# Patient Record
Sex: FEMALE | Race: BLACK OR AFRICAN AMERICAN | NOT HISPANIC OR LATINO | Employment: PART TIME | URBAN - METROPOLITAN AREA
[De-identification: names, ages, dates, MRNs, and addresses within clinical notes are randomized per-mention and may not be internally consistent; named-entity substitution may affect disease eponyms.]

---
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
## 2019-05-03 ENCOUNTER — CLINICAL SUPPORT (OUTPATIENT)
Dept: INTERNAL MEDICINE | Facility: CLINIC | Age: 19
End: 2019-05-03

## 2019-05-03 DIAGNOSIS — Z23 NEED FOR VACCINATION AGAINST HEPATITIS A: ICD-10-CM

## 2019-05-03 PROCEDURE — 90471 IMMUNIZATION ADMIN: CPT | Performed by: PHYSICIAN ASSISTANT

## 2019-05-03 PROCEDURE — 90632 HEPA VACCINE ADULT IM: CPT | Performed by: PHYSICIAN ASSISTANT

## 2019-11-04 ENCOUNTER — CLINICAL SUPPORT (OUTPATIENT)
Dept: INTERNAL MEDICINE | Facility: CLINIC | Age: 19
End: 2019-11-04

## 2019-11-04 DIAGNOSIS — Z23 NEED FOR VACCINATION: ICD-10-CM

## 2019-11-04 PROCEDURE — 90686 IIV4 VACC NO PRSV 0.5 ML IM: CPT | Performed by: PHYSICIAN ASSISTANT

## 2019-11-04 PROCEDURE — 90471 IMMUNIZATION ADMIN: CPT | Performed by: PHYSICIAN ASSISTANT

## 2019-11-04 PROCEDURE — 90632 HEPA VACCINE ADULT IM: CPT | Performed by: PHYSICIAN ASSISTANT

## 2019-11-04 PROCEDURE — 90472 IMMUNIZATION ADMIN EACH ADD: CPT | Performed by: PHYSICIAN ASSISTANT

## 2022-10-26 ENCOUNTER — TELEPHONE (OUTPATIENT)
Dept: INTERNAL MEDICINE | Facility: CLINIC | Age: 22
End: 2022-10-26

## 2022-10-26 NOTE — TELEPHONE ENCOUNTER
Pt hasn't been seen in office w/ pcp for 5 years. Let know of recommendations, will need to be seen if sxs worsen.

## 2022-10-26 NOTE — TELEPHONE ENCOUNTER
PATIENT TESTED POSITIVE FOR COVID.  SYMPTOMS INCLUDE STUFFY NOSE, SORE THROAT, COUGH.  WENT TO URGENT TREATMENT BUT WAS NEGATIVE THERE WITH LOW FEVER.  WONDERS WHAT TO DO NOW?    PLEASE CALL [phone]

## 2024-08-08 ENCOUNTER — OFFICE VISIT (OUTPATIENT)
Dept: INTERNAL MEDICINE | Facility: CLINIC | Age: 24
End: 2024-08-08
Payer: COMMERCIAL

## 2024-08-08 VITALS
BODY MASS INDEX: 16.97 KG/M2 | DIASTOLIC BLOOD PRESSURE: 78 MMHG | SYSTOLIC BLOOD PRESSURE: 118 MMHG | WEIGHT: 99.4 LBS | RESPIRATION RATE: 16 BRPM | HEIGHT: 64 IN | OXYGEN SATURATION: 99 % | TEMPERATURE: 97.2 F | HEART RATE: 108 BPM

## 2024-08-08 DIAGNOSIS — Z00.00 ANNUAL PHYSICAL EXAM: Primary | ICD-10-CM

## 2024-08-08 DIAGNOSIS — Z23 NEED FOR VACCINATION: ICD-10-CM

## 2024-08-08 PROCEDURE — 99385 PREV VISIT NEW AGE 18-39: CPT

## 2024-08-08 PROCEDURE — 90651 9VHPV VACCINE 2/3 DOSE IM: CPT

## 2024-08-08 PROCEDURE — 90471 IMMUNIZATION ADMIN: CPT

## 2024-08-08 RX ORDER — MULTIPLE VITAMINS W/ MINERALS TAB 9MG-400MCG
TAB ORAL DAILY
COMMUNITY

## 2024-08-08 NOTE — PROGRESS NOTES
Female Physical Note      Date: 2024   Patient Name: Charley Sanchez  : 2000   MRN: [id number]     Chief Complaint:    Chief Complaint   Patient presents with    [facility]    Annual Exam       History of Present Illness: Charley Sanchez is a 23 y.o. female who is here today for their annual health maintenance and physical. Patient was previously under the care of a previous provider in the office. Since she was last seen, patient reports she has been doing well overall. 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 Past medical hx of eczema and migraines. She is currently only taking a multivitamin. She eats an overall balanced diet consisting of 2 meals a day. She reports lack of appetite in the morning. She is trying to exercise at least twice a week. Patient denies use of tobacco, ecigarettes, illicit drugs, and alcohol. She has never been sexually active. Reports normal menstrual cycles. She is compliant with ophthalmology and dental screenings. She has no complaints today.      Subjective      Review of Systems:   Review of Systems   Constitutional:  Negative for chills and fever.   HENT:  Negative for congestion and rhinorrhea.    Respiratory:  Negative for shortness of breath.    Cardiovascular:  Negative for chest pain.   Gastrointestinal:  Negative for abdominal pain, constipation and diarrhea.   Endocrine: Negative for polydipsia and polyuria.   Genitourinary:  Negative for dysuria.   Musculoskeletal:  Negative for myalgias.   Skin:  Negative for skin lesions.   Allergic/Immunologic: Negative for environmental allergies.   Neurological:  Negative for dizziness and headache.   Psychiatric/Behavioral:  Negative for suicidal ideas.        Past Medical History, Social History, Family History and Care Team were all reviewed with patient and updated as appropriate.      Medications:     Current Outpatient Medications:     multivitamin with minerals tablet tablet, Take  by mouth Daily., Disp: , Rfl:     Allergies:   Allergies   Allergen Reactions    Tylenol [Acetaminophen] Hives     Grape Flavor       Immunizations:  Health Maintenance Summary            Overdue - HEPATITIS C SCREENING (Once) Never done      No completion, postpone, or frequency change history exists for this topic.              Overdue - PAP SMEAR (Every 3 Years) Never done      No completion, postpone, or frequency change history exists for this topic.              Overdue - TDAP/TD VACCINES (2 - Td or Tdap) Overdue since 8/14/2019 08/14/2009  Outside Immunization: Tdap, Adsorbed              Overdue - COVID-19 Vaccine (4 - 2023-24 season) Overdue since 9/1/2023 01/21/2022  Imm Admin: COVID-19 (PFIZER) Purple Cap Monovalent    05/12/2021  Imm Admin: COVID-19 (PFIZER) Purple Cap Monovalent    04/20/2021  Imm Admin: COVID-19 (PFIZER) Purple Cap Monovalent              INFLUENZA VACCINE (Yearly - August to March) Due since 8/1/2024 11/04/2019  Imm Admin: flucelvax quad pfs =>4 YRS    11/18/2016  Imm Admin: influenza Split              HPV VACCINES (2 - 3-dose series) Due soon on 9/5/2024 08/08/2024  Imm Admin: Hpv9              ANNUAL PHYSICAL (Yearly) Next due on 8/8/2025 08/08/2024  Done              BMI FOLLOWUP (Yearly) Next due on 8/8/2025 08/08/2024  [employer]: BMI EDUCATION FOR UNDERWEIGHT    08/08/2024  [employer]: WORKFLOW - QUALITY MEASUREMENT - DOCUMENTED WEIGHT FOLLOW-UP PLAN              Pneumococcal Vaccine 0-64 (Series Information) Aged Out      No completion, postpone, or frequency change history exists for this topic.                     Orders Placed This Encounter   Procedures    HPV Vaccine      Ophthalmologist: last eye exam 2-3 years  Dentist: compliant with routine cleanings    Tobacco Use: Low Risk  (8/8/2024)    Patient History     Smoking Tobacco Use: Never  "    Smokeless Tobacco Use: Never     Passive Exposure: Not on file       Social History     Substance and Sexual Activity   Alcohol Use No        Social History     Substance and Sexual Activity   Drug Use Never        Diet/Physical activity:eating 2 meals a day, exercising twice a week    Sexual Health: not sexually active  Menstrual Cycles: regular, lasting 6-7 days, last menstrual cycle: 07/19/2024    Depression: PHQ-2 Depression Screening  PHQ-9 Total Score: 0       Objective     Physical Exam:  Vital Signs:   Vitals:    08/08/24 1451   BP: 118/78   Pulse: 108   Resp: 16   Temp: 97.2 °F (36.2 °C)   TempSrc: Temporal   SpO2: 99%   Weight: 45.1 kg (99 lb 6.4 oz)   Height: 162.6 cm (64.02\")   PainSc: 0-No pain     Facility age limit for growth %matt is 20 years.  Body mass index is 17.05 kg/m².     Physical Exam  Vitals and nursing note reviewed. Chaperone present: declined chaperone.   Constitutional:       General: She is not in acute distress.     Appearance: Normal appearance.   HENT:      Head: Normocephalic and atraumatic.      Right Ear: Tympanic membrane, ear canal and external ear normal.      Left Ear: Tympanic membrane, ear canal and external ear normal.      Mouth/Throat:      Pharynx: Oropharynx is clear.   Eyes:      Conjunctiva/sclera: Conjunctivae normal.      Pupils: Pupils are equal, round, and reactive to light.   Neck:      Thyroid: No thyroid mass, thyromegaly or thyroid tenderness.   Cardiovascular:      Rate and Rhythm: Normal rate and regular rhythm.      Pulses: Normal pulses.      Heart sounds: Normal heart sounds. No murmur heard.  Pulmonary:      Effort: Pulmonary effort is normal. No respiratory distress.      Breath sounds: Normal breath sounds. No wheezing.   Chest:   Breasts:     Right: Normal. No swelling, bleeding, inverted nipple, mass, nipple discharge, skin change or tenderness.      Left: Normal. No swelling, bleeding, inverted nipple, mass, nipple discharge, skin change or " tenderness.   Abdominal:      General: Bowel sounds are normal.      Palpations: Abdomen is soft.      Tenderness: There is no abdominal tenderness.   Musculoskeletal:         General: No swelling. Normal range of motion.      Cervical back: Normal range of motion and neck supple.   Skin:     General: Skin is warm and dry.   Neurological:      General: No focal deficit present.      Mental Status: She is alert and oriented to person, place, and time.   Psychiatric:         Mood and Affect: Mood normal.         Behavior: Behavior normal.           Assessment / Plan      Assessment/Plan:   Diagnoses and all orders for this visit:    1. Annual physical exam (Primary)  Assessment & Plan:  - Counseled patient regarding multimodal approach with healthy nutrition, healthy sleep, routine physical activity, counseling, safety measures and medications.  - Encouraged patient to try and drink a protein shake in AM to increase caloric intake  - Pt declined Pap smear      Orders:  -     CBC Auto Differential; Future  -     Comprehensive Metabolic Panel; Future  -     Lipid Panel; Future  -     TSH Rfx On Abnormal To Free T4; Future    2. Need for vaccination  -     HPV Vaccine         Healthcare Maintenance:  Counseling provided based on age appropriate USPSTF guidelines.  BMI is below normal parameters (malnutrition). Recommendations: Information on healthy weight added to patient's after visit summary     Charley chuas understanding and acceptance of this advice and will call back with any further questions or concerns. AVS with preventive healthcare tips printed for patient.     Vaccine Counseling:  “Discussed risks/benefits to vaccination, reviewed components of the vaccine, discussed VIS, discussed informed consent, informed consent obtained. Patient/Parent was allowed to accept or refuse vaccine. Questions answered to satisfactory state of patient/Parent. We reviewed typical age appropriate and seasonally appropriate  vaccinations. Reviewed immunization history and updated state vaccination form as needed. Patient was counseled on HPV    Follow Up:   Return in about 1 year (around 8/8/2025) for Annual.      Patricia Coker PA-C  [facility] [place]

## 2024-08-09 ENCOUNTER — PATIENT ROUNDING (BHMG ONLY) (OUTPATIENT)
Dept: INTERNAL MEDICINE | Facility: CLINIC | Age: 24
End: 2024-08-09
Payer: COMMERCIAL

## 2024-08-09 PROBLEM — Z00.00 ANNUAL PHYSICAL EXAM: Status: ACTIVE | Noted: 2024-08-09

## 2024-08-09 NOTE — ASSESSMENT & PLAN NOTE
- Counseled patient regarding multimodal approach with healthy nutrition, healthy sleep, routine physical activity, counseling, safety measures and medications.  - Encouraged patient to try and drink a protein shake in AM to increase caloric intake  - Pt declined Pap smear

## 2024-08-09 NOTE — PROGRESS NOTES
A My-chart message has been sent to the patient for [place] [facility] [place]

## 2024-08-12 ENCOUNTER — LAB (OUTPATIENT)
Dept: LAB | Facility: HOSPITAL | Age: 24
End: 2024-08-12
Payer: COMMERCIAL

## 2024-08-12 DIAGNOSIS — Z00.00 ANNUAL PHYSICAL EXAM: ICD-10-CM

## 2024-08-12 LAB
BASOPHILS # BLD AUTO: 0.04 10*3/MM3 (ref 0–0.2)
BASOPHILS NFR BLD AUTO: 0.6 % (ref 0–1.5)
DEPRECATED RDW RBC AUTO: 41 FL (ref 37–54)
EOSINOPHIL # BLD AUTO: 0.16 10*3/MM3 (ref 0–0.4)
EOSINOPHIL NFR BLD AUTO: 2.6 % (ref 0.3–6.2)
ERYTHROCYTE [DISTWIDTH] IN BLOOD BY AUTOMATED COUNT: 13.1 % (ref 12.3–15.4)
HCT VFR BLD AUTO: 38.4 % (ref 34–46.6)
HGB BLD-MCNC: 12.7 G/DL (ref 12–15.9)
IMM GRANULOCYTES # BLD AUTO: 0.02 10*3/MM3 (ref 0–0.05)
IMM GRANULOCYTES NFR BLD AUTO: 0.3 % (ref 0–0.5)
LYMPHOCYTES # BLD AUTO: 2 10*3/MM3 (ref 0.7–3.1)
LYMPHOCYTES NFR BLD AUTO: 31.9 % (ref 19.6–45.3)
MCH RBC QN AUTO: 28.6 PG (ref 26.6–33)
MCHC RBC AUTO-ENTMCNC: 33.1 G/DL (ref 31.5–35.7)
MCV RBC AUTO: 86.5 FL (ref 79–97)
MONOCYTES # BLD AUTO: 0.5 10*3/MM3 (ref 0.1–0.9)
MONOCYTES NFR BLD AUTO: 8 % (ref 5–12)
NEUTROPHILS NFR BLD AUTO: 3.55 10*3/MM3 (ref 1.7–7)
NEUTROPHILS NFR BLD AUTO: 56.6 % (ref 42.7–76)
NRBC BLD AUTO-RTO: 0 /100 WBC (ref 0–0.2)
PLATELET # BLD AUTO: 335 10*3/MM3 (ref 140–450)
PMV BLD AUTO: 10 FL (ref 6–12)
RBC # BLD AUTO: 4.44 10*6/MM3 (ref 3.77–5.28)
WBC NRBC COR # BLD AUTO: 6.27 10*3/MM3 (ref 3.4–10.8)

## 2024-08-12 PROCEDURE — 80061 LIPID PANEL: CPT

## 2024-08-12 PROCEDURE — 80050 GENERAL HEALTH PANEL: CPT

## 2024-08-13 LAB
ALBUMIN SERPL-MCNC: 4.1 G/DL (ref 3.5–5.2)
ALBUMIN/GLOB SERPL: 1.1 G/DL
ALP SERPL-CCNC: 56 U/L (ref 39–117)
ALT SERPL W P-5'-P-CCNC: 10 U/L (ref 1–33)
ANION GAP SERPL CALCULATED.3IONS-SCNC: 11.1 MMOL/L (ref 5–15)
AST SERPL-CCNC: 19 U/L (ref 1–32)
BILIRUB SERPL-MCNC: 1.1 MG/DL (ref 0–1.2)
BUN SERPL-MCNC: 9 MG/DL (ref 6–20)
BUN/CREAT SERPL: 11.1 (ref 7–25)
CALCIUM SPEC-SCNC: 9.5 MG/DL (ref 8.6–10.5)
CHLORIDE SERPL-SCNC: 101 MMOL/L (ref 98–107)
CHOLEST SERPL-MCNC: 161 MG/DL (ref 0–200)
CO2 SERPL-SCNC: 23.9 MMOL/L (ref 22–29)
CREAT SERPL-MCNC: 0.81 MG/DL (ref 0.57–1)
EGFRCR SERPLBLD CKD-EPI 2021: 103.5 ML/MIN/1.73
GLOBULIN UR ELPH-MCNC: 3.8 GM/DL
GLUCOSE SERPL-MCNC: 81 MG/DL (ref 65–99)
HDLC SERPL-MCNC: 76 MG/DL (ref 40–60)
LDLC SERPL CALC-MCNC: 74 MG/DL (ref 0–100)
LDLC/HDLC SERPL: 0.98 {RATIO}
POTASSIUM SERPL-SCNC: 4.1 MMOL/L (ref 3.5–5.2)
PROT SERPL-MCNC: 7.9 G/DL (ref 6–8.5)
SODIUM SERPL-SCNC: 136 MMOL/L (ref 136–145)
TRIGL SERPL-MCNC: 54 MG/DL (ref 0–150)
TSH SERPL DL<=0.05 MIU/L-ACNC: 1.71 UIU/ML (ref 0.27–4.2)
VLDLC SERPL-MCNC: 11 MG/DL (ref 5–40)

## 2024-08-15 ENCOUNTER — TELEPHONE (OUTPATIENT)
Dept: INTERNAL MEDICINE | Facility: CLINIC | Age: 24
End: 2024-08-15
Payer: COMMERCIAL

## 2024-08-15 NOTE — TELEPHONE ENCOUNTER
Called and spoke to pt. Gave message from provider. Pt voiced understanding and appreciation.       ----- Message from Patricia Coker sent at 8/15/2024 10:51 AM EDT -----  Please advise patient of lab results:  Complete blood count- stable. No anemia   Thyroid function- normal   Comprehensive metabolic panel- stable electrolytes, liver, and kidney function.    Blood sugar- normal   Cholesterol- within normal limits. Continue to monitor diet and exercise   Thank you!